# Patient Record
Sex: MALE | Race: WHITE | NOT HISPANIC OR LATINO | Employment: UNEMPLOYED | URBAN - METROPOLITAN AREA
[De-identification: names, ages, dates, MRNs, and addresses within clinical notes are randomized per-mention and may not be internally consistent; named-entity substitution may affect disease eponyms.]

---

## 2017-12-14 ENCOUNTER — ALLSCRIPTS OFFICE VISIT (OUTPATIENT)
Dept: OTHER | Facility: OTHER | Age: 62
End: 2017-12-14

## 2017-12-14 ENCOUNTER — APPOINTMENT (OUTPATIENT)
Dept: LAB | Facility: CLINIC | Age: 62
End: 2017-12-14
Payer: COMMERCIAL

## 2017-12-14 DIAGNOSIS — E78.5 HYPERLIPIDEMIA: ICD-10-CM

## 2017-12-14 DIAGNOSIS — R03.0 ELEVATED BLOOD PRESSURE READING WITHOUT DIAGNOSIS OF HYPERTENSION: ICD-10-CM

## 2017-12-14 DIAGNOSIS — D64.9 ANEMIA: ICD-10-CM

## 2017-12-14 DIAGNOSIS — R31.29 OTHER MICROSCOPIC HEMATURIA: ICD-10-CM

## 2017-12-14 LAB
BILIRUB UR QL STRIP: NEGATIVE
CHOLEST SERPL-MCNC: 203 MG/DL (ref 50–200)
CLARITY UR: NORMAL
COLOR UR: YELLOW
ERYTHROCYTE [DISTWIDTH] IN BLOOD BY AUTOMATED COUNT: 15.6 % (ref 11.6–15.1)
FERRITIN SERPL-MCNC: 6 NG/ML (ref 8–388)
GLUCOSE (HISTORICAL): NEGATIVE
HCT VFR BLD AUTO: 42.7 % (ref 36.5–49.3)
HDLC SERPL-MCNC: 65 MG/DL (ref 40–60)
HGB BLD-MCNC: 13.8 G/DL (ref 12–17)
HGB UR QL STRIP.AUTO: NORMAL
IRON SATN MFR SERPL: 11 %
IRON SERPL-MCNC: 55 UG/DL (ref 65–175)
KETONES UR STRIP-MCNC: NEGATIVE MG/DL
LDLC SERPL CALC-MCNC: 126 MG/DL (ref 0–100)
LEUKOCYTE ESTERASE UR QL STRIP: NEGATIVE
MCH RBC QN AUTO: 27.9 PG (ref 26.8–34.3)
MCHC RBC AUTO-ENTMCNC: 32.3 G/DL (ref 31.4–37.4)
MCV RBC AUTO: 86 FL (ref 82–98)
NITRITE UR QL STRIP: NEGATIVE
PLATELET # BLD AUTO: 318 THOUSANDS/UL (ref 149–390)
PMV BLD AUTO: 11.4 FL (ref 8.9–12.7)
PROT UR STRIP-MCNC: 6 MG/DL
RBC # BLD AUTO: 4.95 MILLION/UL (ref 3.88–5.62)
SP GR UR STRIP.AUTO: 1.01
TIBC SERPL-MCNC: 509 UG/DL (ref 250–450)
TRIGL SERPL-MCNC: 58 MG/DL
UROBILINOGEN UR QL STRIP.AUTO: 0.2
WBC # BLD AUTO: 8.58 THOUSAND/UL (ref 4.31–10.16)

## 2017-12-14 PROCEDURE — 36415 COLL VENOUS BLD VENIPUNCTURE: CPT

## 2017-12-14 PROCEDURE — 83550 IRON BINDING TEST: CPT

## 2017-12-14 PROCEDURE — 82728 ASSAY OF FERRITIN: CPT

## 2017-12-14 PROCEDURE — 80061 LIPID PANEL: CPT

## 2017-12-14 PROCEDURE — 85027 COMPLETE CBC AUTOMATED: CPT

## 2017-12-14 PROCEDURE — 83540 ASSAY OF IRON: CPT

## 2017-12-15 NOTE — PROGRESS NOTES
Assessment  1  Hyperlipidemia (272 4) (E78 5)   2  Anemia (285 9) (D64 9)   3  Elevated blood pressure reading in office without diagnosis of hypertension (796 2) (R03 0)   4  Hematuria, microscopic (599 72) (R31 29)    Plan  Anemia    · (1) CBC/ PLT (NO DIFF); Status:Active; Requested for:65Nyi1281;    · (1) FERRITIN; Status:Active; Requested for:78Csy6162;    · (1) IRON SATURATION %, TIBC; Status:Active; Requested for:64Dit8586;    · EGD; Status:Hold For - Scheduling; Requested for:65Fwf0574;   Elevated blood pressure reading in office without diagnosis of hypertension, Hyperlipidemia    · (1) LIPID PANEL FASTING W DIRECT LDL REFLEX; Status:Active; Requested for:02Tfo1852; Hematuria, microscopic    · Urine Dip Non-Automated- POC; Status:Resulted - Requires Verification;   Done: [id number]   · [facility]; Status:Hold For - Scheduling; Requested for:07Ojt6024;     Discussion/Summary  Discussion Summary:   Hyperlipidemia: Lipid panel ordered  Anemia/low ironCBC ordered, iron saturation ordered  Urinalysis was ordered to rule out bleeding from the bladder and showed moderate hematuria on dipstick  Ultrasound of the bladder is ordered  Elevated BP without diagnosis of hypertensionWill continue to monitor his blood pressure on order additional testing  Patient will return to the clinic in 1 week  Patient is taking to the office of the financial counselor for assistance with medical insurance  Counseling Documentation With Imm: The patient was counseled regarding diagnostic results,-- instructions for management,-- risk factor reductions,-- risks and benefits of treatment options,-- importance of compliance with treatment  Patient Education: Educational resources provided:   Medication SE Review and Pt Understands Tx: Possible side effects of new medications were reviewed with the patient/guardian today  The treatment plan was reviewed with the patient/guardian   The patient/guardian understands and agrees with the treatment plan      Chief Complaint  Chief Complaint Free Text Note Form: 70-year-old male presents to the clinic for annual check up      History of Present Illness  HPI: as abovePatient was last seen at this clinic in February 2015  at that time he was suffering from low iron stores and also elevated cholesterol  He was ordered to get colonoscopy and EGD but he did not at that time because of lack of insurance  He reports that he went to visit with his sister in late 2015 and he had a colonoscopy which was negative except for polyps  The polyps were removed at that time  He also reports that he went to donate blood late last year and he was told he could not, because his serum iron was still low  He has not been able to take iron supplements because of problems with constipation  He states he has been checking his blood pressure at the local pharmacies and has been told a few times that his blood pressure is borderline elevated  He does not remember what the numbers were  Today his blood pressure is 120/100 mmHg  He denies headaches, chest pain, shortness of breath, abdominal pain, or change in vision  No nausea, vomiting, or blood in stool  He denies blood in urine  Denies any active bleeding or hemorrhoids  Hospital Based Practices Required Assessment:  Pain Assessment  the patient states they do not have pain  (on a scale of 0 to 10, the patient rates the pain at 0 )   Prefered Language is  [place]  Primary Language is  English  Review of Systems  Complete-Male:  Constitutional: No fever or chills, feels well, no tiredness, no recent weight gain or weight loss  Cardiovascular: No complaints of slow heart rate, no fast heart rate, no chest pain, no palpitations, no leg claudication, no lower extremity  Respiratory: No complaints of shortness of breath, no wheezing, no cough, no SOB on exertion, no orthopnea or PND    Gastrointestinal: No complaints of abdominal pain, no constipation, no nausea or vomiting, no diarrhea or bloody stools  Musculoskeletal: No complaints of arthralgia, no myalgias, no joint swelling or stiffness, no limb pain or swelling  Hematologic/Lymphatic: No complaints of swollen glands, no swollen glands in the neck, does not bleed easily, no easy bruising  ROS Reviewed:   ROS reviewed  Active Problems  1  Anemia (285 9) (D64 9)   2  Hyperlipidemia (272 4) (E78 5)   3  Overweight (278 02) (E66 3)    Past Medical History  1  History of seasonal allergies (V15 09) (Z88 9)   2  History of Tinnitus, right (388 30) (H93 11)  Active Problems And Past Medical History Reviewed: The active problems and past medical history were reviewed and updated today  Surgical History  1  History of Dental Surgery   2  History of Tonsillectomy With Adenoidectomy  Surgical History Reviewed: The surgical history was reviewed and updated today  Family History  Mother    1  Family history of Diabetes  Father    2  Family history of CAD (coronary artery disease)   3  Family history of Heart attack  Family History Reviewed: The family history was reviewed and updated today  Social History   ·    · Exercises regularly   · Lives alone without help available (V60 4) (Z60 2)   · No alcohol use   · No drug use   · Non-smoker (V49 89) (Z78 9)   · Single   · Unemployed, not looking for work  Social History Reviewed: The social history was reviewed and updated today  The social history was reviewed and is unchanged  Current Meds   1  Colace 100 MG Oral Capsule; TAKE 1 CAPSULE TWICE DAILY; Therapy: 16GLV0232 to (Evaluate:94Tax8337)  Requested for: 66YFI0062; Last Rx:96Mzf1249 Ordered   2  Ferrous Sulfate 325 (65 Fe) MG Oral Tablet Delayed Release; take 1 PO daily; Therapy: 84HNQ8358 to (Last Rx:22Hmk1397)  Requested for: 09QXL9329 Ordered   3  Fish Oil OIL; Therapy: (Recorded:83Udm8258) to Recorded  Medication List Reviewed:    The medication list was reviewed and updated today  Allergies  1  No Known Drug Allergies  2  Pollen    Vitals  Vital Signs    Recorded: 63DTG4889 10:34AM   Temperature 98 5 F   Heart Rate 72   Systolic 413   Diastolic 484   Height 5 ft 6 in   Weight 145 lb 0 64 oz   BMI Calculated 23 41   BSA Calculated 1 74     Physical Exam   Constitutional  General appearance: No acute distress, well appearing and well nourished  well developed,-- normal body odor,-- does not smell of feces,-- does not smell of urine,-- well nourished,-- within normal limits of ideal weight,-- clothing appropriate-- and-- well groomed  Eyes  Conjunctiva and lids: No swelling, erythema, or discharge  Pupils and irises: Equal, round and reactive to light  Ears, Nose, Mouth, and Throat  Otoscopic examination: Tympanic membrance translucent with normal light reflex  Canals patent without erythema  Nasal mucosa, septum, and turbinates: Normal without edema or erythema  Pulmonary  Respiratory effort: No increased work of breathing or signs of respiratory distress  Auscultation of lungs: Clear to auscultation, equal breath sounds bilaterally, no wheezes, no rales, no rhonci  Cardiovascular  Auscultation of heart: Normal rate and rhythm, normal S1 and S2, without murmurs  Abdomen  Abdomen: Non-tender, no masses  Liver and spleen: No hepatomegaly or splenomegaly  Musculoskeletal  Gait and station: Normal    Inspection/palpation of joints, bones, and muscles: Normal    Neurologic  Cranial nerves: Cranial nerves 2-12 intact  -- Grossly intact    Psychiatric  Orientation to person, place and time: Normal    Mood and affect: Normal          Results/Data  Urine Dip Non-Automated- POC 81CSE2593 11:28AM Chau Curd     Test Name Result Flag Reference   Color Yellow     Clarity Transparent     Leukocytes Negative     Nitrite Negative     Blood Moderate     Bilirubin Negative     Urobilinogen 0 2     Protein 6 0     Specific Gravity 1 015     Ketone Negative     Glucose Negative

## 2017-12-21 ENCOUNTER — GENERIC CONVERSION - ENCOUNTER (OUTPATIENT)
Dept: OTHER | Facility: OTHER | Age: 62
End: 2017-12-21

## 2018-01-22 VITALS
BODY MASS INDEX: 23.31 KG/M2 | DIASTOLIC BLOOD PRESSURE: 100 MMHG | HEIGHT: 66 IN | WEIGHT: 145.04 LBS | TEMPERATURE: 98.5 F | HEART RATE: 72 BPM | SYSTOLIC BLOOD PRESSURE: 122 MMHG

## 2018-01-23 NOTE — PROGRESS NOTES
Recorded as Task  Date: 12/15/2017 11:08 AM, Created By: Ever Sheriff  Task Name: Care Coordination  Assigned To: Gennaro Martinez  Regarding Patient: Sharee Head, Status: Active   CommentArnetta Face - 15 Dec 2017 11:08 AM    TASK CREATED  please advise patient that his test results show his iron is still low  And his cholesterol is still elevated  I have sent medicine for cholesterol to his pharmacy (lovastatin), 1  Q HS  Also sent iron tabs to his pharmacy; 1 tab daily  Let him take it will dulcolax OTC or any other med for constipation  Thanks  Gerhardt Caper - 48 Dec 2017 11:56 AM    TASK EDITED  Called PT no answer, left voicemail for patient  to call us back  Adrian Razo - 15 Dec 2017 11:56 AM    TASK IN PROGRESS  Alyce Lane - 15 Dec 2017 3:41 PM    TASK EDITED  Pt  left message on nurse line  I returned call to pt  and no answer  Message left to call office regarding  bloodwork and medication  Dedrick,Tabitha - 18 Dec 2017 1:05 PM    TASK EDITED  ATTEMPTED TO CONTACT PATIENT BUT DID NOT ANSWER  VM LEFT TO CALL US BACK, WE WILL ATTEMPT AT A LATER TIME  Tse,Tabitha - 19 Dec 2017 4:19 PM    TASK  EDITED  ATTEMPTED MADE TO CALL PATIENT BUT DID NOT ANSWER, VM LEFT TO CALL US BACK  WE WILL ATTEMPT AT A LATER TIME  Tse,Tabitha - 20 Dec 2017 8:21 AM    TASK EDITED  ATTEMPTED MADE TO CONTACT PT AND DID NOT ANSWER  CAN A LETTER  BE SEND TO THIS PATIENT   Yuko Tse,Tabitha - 20 Dec 2017 8:22 AM    TASK REASSIGNED: Previously Assigned To [place] clinical,Team  Agnieszka Vargas - 21 Dec 2017 8:35 AM    TASK EDITED  GENERAL CORRESPONDENCE LTR CREATED, SCANNED  TO CHART & MAILED TO PT  Agnieszka Vargas - 21 Dec 2017 8:35 AM    TASK COMPLETED  DedrickTabitha - 21 Dec 2017 11:20 AM    TASK REACTIVATED  Tabitha Tse - 21 Dec 2017 11:31 AM    TASK EDITED  PT CAME TO THE CLINIC TODAY ASKING  WHY WE WHERE CALLING HIM SO WENT OUTSIDE AND CALL PT TO A PRIVATE AREA AND I LET HIM KNOW ABOUT WHY WE WERE CALLING HIM AND HE STATE THAT HE CANCEL THE CHOLESTEROL MEDICINE BECASUE HE DON'T HAVE MONEY AND FOR THE IRON Tabitha Mike - 21 Dec  2017 11:32 AM    TASK REASSIGNED: Previously Assigned To [place] clerical,Team  FOR Sylvester Swan - 21 Dec 2017 1:11 PM    TASK EDITED  Verified message with Steve Parsons  She reports patient came here and says he does not want to  tae the ferrous sulfate and does not want to ay for cholesterol medication   Perla Chacon

## 2018-03-22 ENCOUNTER — HOSPITAL ENCOUNTER (EMERGENCY)
Facility: HOSPITAL | Age: 63
Discharge: HOME | End: 2018-03-22
Attending: EMERGENCY MEDICINE | Admitting: EMERGENCY MEDICINE
Payer: COMMERCIAL

## 2018-03-22 VITALS
WEIGHT: 150 LBS | HEART RATE: 68 BPM | RESPIRATION RATE: 18 BRPM | SYSTOLIC BLOOD PRESSURE: 148 MMHG | DIASTOLIC BLOOD PRESSURE: 85 MMHG | TEMPERATURE: 97.3 F | BODY MASS INDEX: 24.21 KG/M2 | OXYGEN SATURATION: 100 %

## 2018-03-22 DIAGNOSIS — T78.40XA ALLERGIC REACTION: Primary | ICD-10-CM

## 2018-03-22 PROCEDURE — 99282 EMERGENCY DEPT VISIT SF MDM: CPT

## 2018-03-22 RX ORDER — DIPHENHYDRAMINE HCL 25 MG
25 CAPSULE ORAL EVERY 6 HOURS PRN
Qty: 20 CAPSULE | Refills: 0 | Status: SHIPPED | OUTPATIENT
Start: 2018-03-22

## 2018-03-22 RX ORDER — EPINEPHRINE 0.3 MG/.3ML
0.3 INJECTION SUBCUTANEOUS ONCE
Qty: 0.3 ML | Refills: 0 | Status: SHIPPED | OUTPATIENT
Start: 2018-03-22 | End: 2018-03-22

## 2018-03-22 RX ORDER — PREDNISONE 20 MG/1
40 TABLET ORAL DAILY
Qty: 10 TABLET | Refills: 0 | Status: SHIPPED | OUTPATIENT
Start: 2018-03-22 | End: 2018-03-22

## 2018-03-22 RX ORDER — PREDNISONE 20 MG/1
40 TABLET ORAL ONCE
Status: COMPLETED | OUTPATIENT
Start: 2018-03-22 | End: 2018-03-22

## 2018-03-22 RX ORDER — DIPHENHYDRAMINE HCL 25 MG
25 CAPSULE ORAL EVERY 6 HOURS PRN
Qty: 20 CAPSULE | Refills: 0 | Status: SHIPPED | OUTPATIENT
Start: 2018-03-22 | End: 2018-03-22

## 2018-03-22 RX ORDER — PREDNISONE 20 MG/1
40 TABLET ORAL DAILY
Qty: 10 TABLET | Refills: 0 | Status: SHIPPED | OUTPATIENT
Start: 2018-03-22 | End: 2018-03-27

## 2018-03-22 RX ADMIN — PREDNISONE 40 MG: 20 TABLET ORAL at 10:57

## 2018-03-22 NOTE — ED PROVIDER NOTES
History  Chief Complaint   Patient presents with    Rash     burning, iching feeling all over his body this AM      HPI  Patient states that about an hour and a half after eating oatmeal and fruity started with itching starting at his ears and head and working its way down his entire body is states his body was diffusely red  Patient states that he got yogurt and put on his body and has helped a little bit with the itching and redness and that that has improved somewhat  Patient states he had no shortness of breath or swelling of his throat  Patient did not experience any lightheadedness  Patient has no history of the same he does note that he has a pollen allergy in the past that caused him to have upper respiratory congestion but nothing else  He does not admit to any new medication or perfumes or laundry detergents  MDM:  Will give patient Benadryl and prednisone EpiPen for home refer back to PCP for evaluation of allergy  None       History reviewed  No pertinent past medical history  History reviewed  No pertinent surgical history  History reviewed  No pertinent family history  I have reviewed and agree with the history as documented  Social History   Substance Use Topics    Smoking status: Never Smoker    Smokeless tobacco: Never Used    Alcohol use No        Review of Systems   Constitutional: Negative for activity change, fatigue and fever  HENT: Negative for congestion, mouth sores, trouble swallowing and voice change  Respiratory: Negative for chest tightness, shortness of breath and wheezing  Cardiovascular: Negative for chest pain  Gastrointestinal: Negative for abdominal pain  Musculoskeletal: Negative for joint swelling and myalgias  Skin: Positive for rash  Neurological: Negative for weakness, light-headedness and headaches  All other systems reviewed and are negative        Physical Exam  ED Triage Vitals [03/22/18 1021]   Temperature Pulse Respirations Blood Pressure SpO2   (!) 97 3 °F (36 3 °C) 68 18 148/85 100 %      Temp Source Heart Rate Source Patient Position - Orthostatic VS BP Location FiO2 (%)   Tympanic -- -- -- --      Pain Score       8           Orthostatic Vital Signs  Vitals:    03/22/18 1021   BP: 148/85   Pulse: 68       Physical Exam   Constitutional: He is oriented to person, place, and time  He appears well-developed and well-nourished  No distress  HENT:   Mouth/Throat: Oropharynx is clear and moist    No swelling noted   Eyes: Pupils are equal, round, and reactive to light  Neck: Normal range of motion  Neck supple  Cardiovascular: Normal rate, regular rhythm, normal heart sounds and intact distal pulses  Pulmonary/Chest: Effort normal and breath sounds normal    Abdominal: Soft  Bowel sounds are normal    Musculoskeletal: Normal range of motion  Neurological: He is alert and oriented to person, place, and time  No cranial nerve deficit or sensory deficit  He exhibits normal muscle tone  Skin: Skin is warm  Rash noted  He is not diaphoretic  There is erythema  Vitals reviewed  ED Medications  Medications   predniSONE tablet 40 mg (40 mg Oral Given 3/22/18 1057)       Diagnostic Studies  Results Reviewed     None                 No orders to display              Procedures  Procedures       Phone Contacts  ED Phone Contact    ED Course  ED Course                                MDM  CritCare Time    Disposition  Final diagnoses: Allergic reaction     Time reflects when diagnosis was documented in both MDM as applicable and the Disposition within this note     Time User Action Codes Description Comment    3/22/2018 10:52 AM Padmini Haider Add [T78 [id number]] Allergic reaction       ED Disposition     ED Disposition Condition Comment    Discharge  Wanna Folds discharge to home/self care      Condition at discharge: Good        Follow-up Information     Follow up With Specialties Details Why [address] Alejandro Cedeno DO Internal Medicine   [address]  [phone]          Patient's Medications   Discharge Prescriptions    DIPHENHYDRAMINE (BENADRYL) 25 MG CAPSULE    Take 1 capsule (25 mg total) by mouth every 6 (six) hours as needed for itching       Start Date: 3/22/2018 End Date: --       Order Dose: 25 mg       Quantity: 20 capsule    Refills: 0    EPINEPHRINE (EPIPEN) 0 3 MG/0 3 ML SOAJ    Inject 0 3 mL (0 3 mg total) into the shoulder, thigh, or buttocks once for 1 dose       Start Date: 3/22/2018 End Date: 3/22/2018       Order Dose: 0 3 mg       Quantity: 0 3 mL    Refills: 0    PREDNISONE 20 MG TABLET    Take 2 tablets (40 mg total) by mouth daily for 5 days       Start Date: 3/22/2018 End Date: 3/27/2018       Order Dose: 40 mg       Quantity: 10 tablet    Refills: 0     No discharge procedures on file      ED Provider  Electronically Signed by           Idamae Spatz, MD  03/24/18 4250

## 2018-03-22 NOTE — DISCHARGE INSTRUCTIONS
General Allergic Reaction   AMBULATORY CARE:   A general allergic reaction  is your body's response to an allergen  Allergens include medicines, food, insect stings, animal dander, mold, latex, chemicals, and dust mites  Pollen from trees, grass, and weeds can also cause an allergic reaction  Seek care immediately if:   · You have a skin rash, hives, swelling, or itching that gets worse  · You have trouble breathing, shortness of breath, wheezing, or coughing  · Your throat tightens, or your lips or tongue swell  · You have trouble swallowing or speaking  · You have dizziness, lightheadedness, fainting, or confusion  · You have nausea, vomiting, diarrhea, or abdominal cramps  · You have chest pain or tightness  Contact your healthcare provider if:   · You have questions or concerns about your condition or care  Treatment for a general allergic reaction  may include medicines to relieve certain allergy symptoms such as itching, sneezing, and swelling  You may take them as a pill or use drops in your nose or eyes  Topical treatments may be given to put directly on your skin to help decrease itching or swelling  Manage allergic reactions:   · Avoid the allergen  that you think may have caused your allergic reaction  · Use cold compresses  on your skin or eyes if they were affected by the allergic reaction  Cold compresses may help to soothe your skin or eyes  · Rinse your nasal passages  with a saline solution  Daily rinsing may help clear your nose of allergens  · Do not smoke  Your allergy symptoms may decrease if you are not around smoke  Nicotine and other chemicals in cigarettes and cigars can also cause lung damage  Ask your healthcare provider for information if you currently smoke and need help to quit  E-cigarettes or smokeless tobacco still contain nicotine  Talk to your healthcare provider before you use these products    Follow up with your healthcare provider as directed:  Write down your questions so you remember to ask them during your visits  © 2017 [address] [employer] Information is for End User's use only and may not be sold, redistributed or otherwise used for commercial purposes  All illustrations and images included in CareNotes® are the copyrighted property of [employer]  or [address]  The above information is an  only  It is not intended as medical advice for individual conditions or treatments  Talk to your doctor, nurse or pharmacist before following any medical regimen to see if it is safe and effective for you